# Patient Record
Sex: FEMALE | Race: ASIAN | NOT HISPANIC OR LATINO | URBAN - METROPOLITAN AREA
[De-identification: names, ages, dates, MRNs, and addresses within clinical notes are randomized per-mention and may not be internally consistent; named-entity substitution may affect disease eponyms.]

---

## 2017-01-25 ENCOUNTER — OUTPATIENT (OUTPATIENT)
Dept: OUTPATIENT SERVICES | Facility: HOSPITAL | Age: 67
LOS: 1 days | End: 2017-01-25
Payer: COMMERCIAL

## 2017-01-25 DIAGNOSIS — R07.89 OTHER CHEST PAIN: ICD-10-CM

## 2017-01-25 PROCEDURE — 78452 HT MUSCLE IMAGE SPECT MULT: CPT

## 2017-01-25 PROCEDURE — A9502: CPT

## 2017-01-25 PROCEDURE — 93017 CV STRESS TEST TRACING ONLY: CPT

## 2024-09-13 ENCOUNTER — APPOINTMENT (OUTPATIENT)
Dept: RADIATION ONCOLOGY | Facility: CLINIC | Age: 74
End: 2024-09-13
Payer: MEDICARE

## 2024-09-13 VITALS
RESPIRATION RATE: 16 BRPM | OXYGEN SATURATION: 96 % | DIASTOLIC BLOOD PRESSURE: 69 MMHG | HEART RATE: 68 BPM | TEMPERATURE: 98.4 F | SYSTOLIC BLOOD PRESSURE: 147 MMHG

## 2024-09-13 DIAGNOSIS — I10 ESSENTIAL (PRIMARY) HYPERTENSION: ICD-10-CM

## 2024-09-13 DIAGNOSIS — E11.9 TYPE 2 DIABETES MELLITUS W/OUT COMPLICATIONS: ICD-10-CM

## 2024-09-13 DIAGNOSIS — F32.A ANXIETY DISORDER, UNSPECIFIED: ICD-10-CM

## 2024-09-13 DIAGNOSIS — Z78.9 OTHER SPECIFIED HEALTH STATUS: ICD-10-CM

## 2024-09-13 DIAGNOSIS — F41.9 ANXIETY DISORDER, UNSPECIFIED: ICD-10-CM

## 2024-09-13 DIAGNOSIS — D05.11 INTRADUCTAL CARCINOMA IN SITU OF RIGHT BREAST: ICD-10-CM

## 2024-09-13 PROCEDURE — 99204 OFFICE O/P NEW MOD 45 MIN: CPT

## 2024-09-13 RX ORDER — INSULIN ASPART 100 [IU]/ML
(70-30) 100 INJECTION, SUSPENSION SUBCUTANEOUS
Refills: 0 | Status: ACTIVE | COMMUNITY
Start: 2024-09-13

## 2024-09-13 RX ORDER — TRAMADOL HYDROCHLORIDE 50 MG/1
50 TABLET, COATED ORAL
Refills: 0 | Status: ACTIVE | COMMUNITY
Start: 2024-09-13

## 2024-09-13 RX ORDER — SITAGLIPTIN AND METFORMIN HYDROCHLORIDE 50; 500 MG/1; MG/1
50-500 TABLET, FILM COATED, EXTENDED RELEASE ORAL
Refills: 0 | Status: ACTIVE | COMMUNITY
Start: 2024-09-13

## 2024-09-13 RX ORDER — ESCITALOPRAM OXALATE 20 MG/1
20 TABLET ORAL DAILY
Refills: 0 | Status: ACTIVE | COMMUNITY
Start: 2024-09-13

## 2024-09-13 RX ORDER — LOSARTAN POTASSIUM 50 MG/1
50 TABLET, FILM COATED ORAL DAILY
Refills: 0 | Status: ACTIVE | COMMUNITY
Start: 2024-09-13

## 2024-09-13 RX ORDER — CARVEDILOL 25 MG/1
25 TABLET, FILM COATED ORAL TWICE DAILY
Refills: 0 | Status: ACTIVE | COMMUNITY
Start: 2024-09-13

## 2024-09-13 NOTE — PHYSICAL EXAM
[Normal] : well developed, well nourished, in no acute distress [Sclera] : the sclera and conjunctiva were normal [Extraocular Movements] : extraocular movements were intact [Outer Ear] : the ears and nose were normal in appearance [Hearing Threshold Finger Rub Not [place]] : hearing was normal [] : no respiratory distress [Respiration, Rhythm And Depth] : normal respiratory rhythm and effort [Abdomen Soft] : soft [Nondistended] : nondistended [Range of Motion to Joints] : range of motion to joints [Nail Clubbing] : no clubbing  or cyanosis of the fingernails [Skin Color & Pigmentation] : normal skin color and pigmentation [No Focal Deficits] : no focal deficits [Oriented To Time, Place, And Person] : oriented to person, place, and time [Affect] : the affect was normal [de-identified] : well healed b/l scars UOQ. Large pendulous breasts.

## 2024-09-13 NOTE — PHYSICAL EXAM
[Normal] : well developed, well nourished, in no acute distress [Sclera] : the sclera and conjunctiva were normal [Extraocular Movements] : extraocular movements were intact [Outer Ear] : the ears and nose were normal in appearance [Hearing Threshold Finger Rub Not [place]] : hearing was normal [] : no respiratory distress [Respiration, Rhythm And Depth] : normal respiratory rhythm and effort [Abdomen Soft] : soft [Nondistended] : nondistended [Range of Motion to Joints] : range of motion to joints [Nail Clubbing] : no clubbing  or cyanosis of the fingernails [Skin Color & Pigmentation] : normal skin color and pigmentation [No Focal Deficits] : no focal deficits [Oriented To Time, Place, And Person] : oriented to person, place, and time [Affect] : the affect was normal [de-identified] : well healed b/l scars UOQ. Large pendulous breasts.

## 2024-09-13 NOTE — REVIEW OF SYSTEMS
[Constipation] : constipation [Joint Pain] : joint pain [Muscle Weakness] : muscle weakness [Anxiety] : anxiety [Fever] : no fever [Chills] : no chills [Eye Pain] : no eye pain [Red Eyes] : eyes not red [Dysphagia] : no dysphagia [Loss of Hearing] : no loss of hearing [Chest Pain] : no chest pain [Palpitations] : no palpitations [Shortness Of Breath] : no shortness of breath [Cough] : no cough [Abdominal Pain] : no abdominal pain [Vomiting] : no vomiting [Diarrhea] : no diarrhea [Urinary Frequency] : no change in urinary frequency [Nocturia] : no nocturia [Gait Disturbance] : no gait disturbance [Confused] : no confusion [Dizziness] : no dizziness [Easy Bleeding] : no tendency for easy bleeding [Easy Bruising] : no tendency for easy bruising [FreeTextEntry9] : hx arthritis [de-identified] : sx breast sites healed

## 2024-09-13 NOTE — REVIEW OF SYSTEMS
[Constipation] : constipation [Joint Pain] : joint pain [Muscle Weakness] : muscle weakness [Anxiety] : anxiety [Fever] : no fever [Chills] : no chills [Eye Pain] : no eye pain [Red Eyes] : eyes not red [Dysphagia] : no dysphagia [Loss of Hearing] : no loss of hearing [Chest Pain] : no chest pain [Palpitations] : no palpitations [Shortness Of Breath] : no shortness of breath [Cough] : no cough [Abdominal Pain] : no abdominal pain [Vomiting] : no vomiting [Diarrhea] : no diarrhea [Urinary Frequency] : no change in urinary frequency [Nocturia] : no nocturia [Gait Disturbance] : no gait disturbance [Confused] : no confusion [Dizziness] : no dizziness [Easy Bleeding] : no tendency for easy bleeding [Easy Bruising] : no tendency for easy bruising [FreeTextEntry9] : hx arthritis [de-identified] : sx breast sites healed

## 2024-09-13 NOTE — PHYSICAL EXAM
[Normal] : well developed, well nourished, in no acute distress [Sclera] : the sclera and conjunctiva were normal [Extraocular Movements] : extraocular movements were intact [Outer Ear] : the ears and nose were normal in appearance [Hearing Threshold Finger Rub Not [place]] : hearing was normal [] : no respiratory distress [Respiration, Rhythm And Depth] : normal respiratory rhythm and effort [Abdomen Soft] : soft [Nondistended] : nondistended [Range of Motion to Joints] : range of motion to joints [Nail Clubbing] : no clubbing  or cyanosis of the fingernails [Skin Color & Pigmentation] : normal skin color and pigmentation [No Focal Deficits] : no focal deficits [Oriented To Time, Place, And Person] : oriented to person, place, and time [Affect] : the affect was normal [de-identified] : well healed b/l scars UOQ. Large pendulous breasts.

## 2024-09-13 NOTE — HISTORY OF PRESENT ILLNESS
[FreeTextEntry1] : The patient is a pleasant 74 year old female diagnosed with DCIS of the right breast referred by Dr. Bocanegra.  6/27/24 screening mammogram/ sonogram showed B/L breast calcifications, b/l biopsy recommended.  7/18/24 breast biopsy: - right breast 9:00, N10 cm showed Ductal carcinoma in Situ, intermediate Nuclear grade, cribriform and micropapillary, arising in a background of atypical ductal hyperplasia and flat epithelial atypia. Necrosis and microcalcifications. ER %, [place] 81-90% positive,  - Left breast 2:00 N10 cm Focal atypical hyperplasia, hyalinized fibroadenoma with calcifications. Suspicious.  8/7/24 Right breast lumpectomy 9:00 axis, pathology revealed: DCIS at least 18 mm, present in 3/20 blocks, cribriform, micropapillary, solid. Grade III All margins negative and greater then 2 mm. ER, VA positive per biopsy results.  She feels generally well and offers no acute complaints. Intermittent soreness left breast sx site and tingling. She lives in [place] with her daughter. She is travelling to [facility] 10/4/24 for about 6 months.   She presents with her daughter to discuss the role of radiation therapy in her care.

## 2024-09-13 NOTE — REVIEW OF SYSTEMS
[Constipation] : constipation [Joint Pain] : joint pain [Muscle Weakness] : muscle weakness [Anxiety] : anxiety [Fever] : no fever [Chills] : no chills [Eye Pain] : no eye pain [Red Eyes] : eyes not red [Dysphagia] : no dysphagia [Loss of Hearing] : no loss of hearing [Chest Pain] : no chest pain [Palpitations] : no palpitations [Shortness Of Breath] : no shortness of breath [Cough] : no cough [Abdominal Pain] : no abdominal pain [Vomiting] : no vomiting [Diarrhea] : no diarrhea [Urinary Frequency] : no change in urinary frequency [Nocturia] : no nocturia [Gait Disturbance] : no gait disturbance [Confused] : no confusion [Dizziness] : no dizziness [Easy Bleeding] : no tendency for easy bleeding [Easy Bruising] : no tendency for easy bruising [FreeTextEntry9] : hx arthritis [de-identified] : sx breast sites healed

## 2024-09-13 NOTE — HISTORY OF PRESENT ILLNESS
[FreeTextEntry1] : The patient is a pleasant 74 year old female diagnosed with DCIS of the right breast referred by Dr. Bocanegra.  6/27/24 screening mammogram/ sonogram showed B/L breast calcifications, b/l biopsy recommended.  7/18/24 breast biopsy: - right breast 9:00, N10 cm showed Ductal carcinoma in Situ, intermediate Nuclear grade, cribriform and micropapillary, arising in a background of atypical ductal hyperplasia and flat epithelial atypia. Necrosis and microcalcifications. ER %, [place] 81-90% positive,  - Left breast 2:00 N10 cm Focal atypical hyperplasia, hyalinized fibroadenoma with calcifications. Suspicious.  8/7/24 Right breast lumpectomy 9:00 axis, pathology revealed: DCIS at least 18 mm, present in 3/20 blocks, cribriform, micropapillary, solid. Grade III All margins negative and greater then 2 mm. ER, PA positive per biopsy results.  She feels generally well and offers no acute complaints. Intermittent soreness left breast sx site and tingling. She lives in [place] with her daughter. She is travelling to [facility] 10/4/24 for about 6 months.   She presents with her daughter to discuss the role of radiation therapy in her care.

## 2024-09-13 NOTE — HISTORY OF PRESENT ILLNESS
[FreeTextEntry1] : The patient is a pleasant 74 year old female diagnosed with DCIS of the right breast referred by Dr. Bocanegra.  6/27/24 screening mammogram/ sonogram showed B/L breast calcifications, b/l biopsy recommended.  7/18/24 breast biopsy: - right breast 9:00, N10 cm showed Ductal carcinoma in Situ, intermediate Nuclear grade, cribriform and micropapillary, arising in a background of atypical ductal hyperplasia and flat epithelial atypia. Necrosis and microcalcifications. ER %, [place] 81-90% positive,  - Left breast 2:00 N10 cm Focal atypical hyperplasia, hyalinized fibroadenoma with calcifications. Suspicious.  8/7/24 Right breast lumpectomy 9:00 axis, pathology revealed: DCIS at least 18 mm, present in 3/20 blocks, cribriform, micropapillary, solid. Grade III All margins negative and greater then 2 mm. ER, [place] positive per biopsy results.  She feels generally well and offers no acute complaints. Intermittent soreness left breast sx site and tingling. She lives in [place] with her daughter. She is travelling to [facility] 10/4/24 for about 6 months.   She presents with her daughter to discuss the role of radiation therapy in her care.